# Patient Record
Sex: MALE | Race: OTHER | Employment: FULL TIME | URBAN - METROPOLITAN AREA
[De-identification: names, ages, dates, MRNs, and addresses within clinical notes are randomized per-mention and may not be internally consistent; named-entity substitution may affect disease eponyms.]

---

## 2024-08-14 ENCOUNTER — OFFICE VISIT (OUTPATIENT)
Dept: FAMILY MEDICINE CLINIC | Facility: CLINIC | Age: 34
End: 2024-08-14
Payer: COMMERCIAL

## 2024-08-14 VITALS
SYSTOLIC BLOOD PRESSURE: 108 MMHG | RESPIRATION RATE: 18 BRPM | HEIGHT: 69 IN | OXYGEN SATURATION: 99 % | DIASTOLIC BLOOD PRESSURE: 84 MMHG | TEMPERATURE: 98 F | BODY MASS INDEX: 40.88 KG/M2 | WEIGHT: 276 LBS | HEART RATE: 68 BPM

## 2024-08-14 DIAGNOSIS — M54.9 CHRONIC BACK PAIN, UNSPECIFIED BACK LOCATION, UNSPECIFIED BACK PAIN LATERALITY: ICD-10-CM

## 2024-08-14 DIAGNOSIS — Z83.3 FAMILY HISTORY OF DIABETES MELLITUS: ICD-10-CM

## 2024-08-14 DIAGNOSIS — Z00.00 LABORATORY EXAM ORDERED AS PART OF ROUTINE GENERAL MEDICAL EXAMINATION: ICD-10-CM

## 2024-08-14 DIAGNOSIS — H91.92 HEARING LOSS OF LEFT EAR, UNSPECIFIED HEARING LOSS TYPE: ICD-10-CM

## 2024-08-14 DIAGNOSIS — Z23 NEED FOR TDAP VACCINATION: ICD-10-CM

## 2024-08-14 DIAGNOSIS — E66.01 MORBID OBESITY (HCC): ICD-10-CM

## 2024-08-14 DIAGNOSIS — Z13.1 SCREENING FOR DIABETES MELLITUS: ICD-10-CM

## 2024-08-14 DIAGNOSIS — G89.29 CHRONIC BACK PAIN, UNSPECIFIED BACK LOCATION, UNSPECIFIED BACK PAIN LATERALITY: ICD-10-CM

## 2024-08-14 DIAGNOSIS — Z00.00 WELLNESS EXAMINATION: Primary | ICD-10-CM

## 2024-08-14 LAB
ALBUMIN SERPL-MCNC: 4.5 G/DL (ref 3.2–4.8)
ALBUMIN/GLOB SERPL: 1.5 {RATIO} (ref 1–2)
ALP LIVER SERPL-CCNC: 102 U/L
ALT SERPL-CCNC: 34 U/L
ANION GAP SERPL CALC-SCNC: 5 MMOL/L (ref 0–18)
AST SERPL-CCNC: 22 U/L (ref ?–34)
BASOPHILS # BLD AUTO: 0.06 X10(3) UL (ref 0–0.2)
BASOPHILS NFR BLD AUTO: 0.8 %
BILIRUB SERPL-MCNC: 0.4 MG/DL (ref 0.3–1.2)
BUN BLD-MCNC: 18 MG/DL (ref 9–23)
CALCIUM BLD-MCNC: 9.7 MG/DL (ref 8.7–10.4)
CHLORIDE SERPL-SCNC: 104 MMOL/L (ref 98–112)
CHOLEST SERPL-MCNC: 161 MG/DL (ref ?–200)
CO2 SERPL-SCNC: 28 MMOL/L (ref 21–32)
CREAT BLD-MCNC: 0.96 MG/DL
EGFRCR SERPLBLD CKD-EPI 2021: 108 ML/MIN/1.73M2 (ref 60–?)
EOSINOPHIL # BLD AUTO: 0.3 X10(3) UL (ref 0–0.7)
EOSINOPHIL NFR BLD AUTO: 4.1 %
ERYTHROCYTE [DISTWIDTH] IN BLOOD BY AUTOMATED COUNT: 12.1 %
EST. AVERAGE GLUCOSE BLD GHB EST-MCNC: 120 MG/DL (ref 68–126)
FASTING PATIENT LIPID ANSWER: YES
FASTING STATUS PATIENT QL REPORTED: YES
GLOBULIN PLAS-MCNC: 3.1 G/DL (ref 2–3.5)
GLUCOSE BLD-MCNC: 105 MG/DL (ref 70–99)
HBA1C MFR BLD: 5.8 % (ref ?–5.7)
HCT VFR BLD AUTO: 45.6 %
HCV AB SERPL QL IA: NONREACTIVE
HDLC SERPL-MCNC: 33 MG/DL (ref 40–59)
HGB BLD-MCNC: 15.6 G/DL
IMM GRANULOCYTES # BLD AUTO: 0.05 X10(3) UL (ref 0–1)
IMM GRANULOCYTES NFR BLD: 0.7 %
LDLC SERPL CALC-MCNC: 95 MG/DL (ref ?–100)
LYMPHOCYTES # BLD AUTO: 2.47 X10(3) UL (ref 1–4)
LYMPHOCYTES NFR BLD AUTO: 33.4 %
MCH RBC QN AUTO: 30.2 PG (ref 26–34)
MCHC RBC AUTO-ENTMCNC: 34.2 G/DL (ref 31–37)
MCV RBC AUTO: 88.2 FL
MONOCYTES # BLD AUTO: 0.72 X10(3) UL (ref 0.1–1)
MONOCYTES NFR BLD AUTO: 9.7 %
NEUTROPHILS # BLD AUTO: 3.79 X10 (3) UL (ref 1.5–7.7)
NEUTROPHILS # BLD AUTO: 3.79 X10(3) UL (ref 1.5–7.7)
NEUTROPHILS NFR BLD AUTO: 51.3 %
NONHDLC SERPL-MCNC: 128 MG/DL (ref ?–130)
OSMOLALITY SERPL CALC.SUM OF ELEC: 286 MOSM/KG (ref 275–295)
PLATELET # BLD AUTO: 250 10(3)UL (ref 150–450)
POTASSIUM SERPL-SCNC: 3.8 MMOL/L (ref 3.5–5.1)
PROT SERPL-MCNC: 7.6 G/DL (ref 5.7–8.2)
RBC # BLD AUTO: 5.17 X10(6)UL
SODIUM SERPL-SCNC: 137 MMOL/L (ref 136–145)
TRIGL SERPL-MCNC: 192 MG/DL (ref 30–149)
TSI SER-ACNC: 1.55 MIU/ML (ref 0.55–4.78)
VLDLC SERPL CALC-MCNC: 32 MG/DL (ref 0–30)
WBC # BLD AUTO: 7.4 X10(3) UL (ref 4–11)

## 2024-08-14 PROCEDURE — 3008F BODY MASS INDEX DOCD: CPT | Performed by: FAMILY MEDICINE

## 2024-08-14 PROCEDURE — 80061 LIPID PANEL: CPT | Performed by: FAMILY MEDICINE

## 2024-08-14 PROCEDURE — 3079F DIAST BP 80-89 MM HG: CPT | Performed by: FAMILY MEDICINE

## 2024-08-14 PROCEDURE — 86803 HEPATITIS C AB TEST: CPT | Performed by: FAMILY MEDICINE

## 2024-08-14 PROCEDURE — 90715 TDAP VACCINE 7 YRS/> IM: CPT | Performed by: FAMILY MEDICINE

## 2024-08-14 PROCEDURE — 99203 OFFICE O/P NEW LOW 30 MIN: CPT | Performed by: FAMILY MEDICINE

## 2024-08-14 PROCEDURE — 99395 PREV VISIT EST AGE 18-39: CPT | Performed by: FAMILY MEDICINE

## 2024-08-14 PROCEDURE — 3074F SYST BP LT 130 MM HG: CPT | Performed by: FAMILY MEDICINE

## 2024-08-14 PROCEDURE — 90471 IMMUNIZATION ADMIN: CPT | Performed by: FAMILY MEDICINE

## 2024-08-14 PROCEDURE — 80050 GENERAL HEALTH PANEL: CPT | Performed by: FAMILY MEDICINE

## 2024-08-14 PROCEDURE — 83036 HEMOGLOBIN GLYCOSYLATED A1C: CPT | Performed by: FAMILY MEDICINE

## 2024-08-14 NOTE — PATIENT INSTRUCTIONS
You received the Tdap (tetanus, diphtheria, pertussis-whooping cough) vaccine today. You may run a low grade fever, have mild redness or swelling at the site of the shot, muscle pain at the site of the shot for the next 2-3 days. You may take Tylenol or Ibuprofen as needed. Use your arm to help decrease pain and swelling. You can apply ice to any swelling for 10-15 minutes twice daily through clothing or a towel.    For premenopausal women and men, 1000 mg of calcium daily is recommended. For postmenopausal women, 1200 mg of calcium daily is recommended.    To help the body absorb and use calcium, vitamin D 2000 international units daily is recommended.    Recommendations for exercise are 3-5 times weekly for 30-60 minutes for a minimum of 150-300 minutes.     Get your flu shot and the new COVID booster in October at your local pharmacy.    I will contact you with your test results once available.    Patient Resources:     Personal Training/Fitness Classes/Health Coaching     [facility] in [place]: Full fitness center with group fitness and personal training located in [place].  Health Coaching with Arielle Espinal, Jadon Escalante, and Yaw Lai at our [facility]- individual coaching to work on your health goals. Call [phone] and/or email @ [email or web address]. Free 60 minute consult when client of [facility].  [address] [place] [address] http://www.[address] [place], [place] [address]SolvAxis. A variety of group fitness options plus various yoga classes [phone] and/or email Fartun at [email or web address]  [facility] with multiple locations: DDx Media Fitness (www.Deep Fiber Solutions.Owned it), F45 Training (www.o05toschruu.Owned it), Fit Body Bootcamp (www.SNAPCARDboEnecsysp.Owned it), AV Homes (www.Nezasa.Owned it), The Exercise  (www.exercisecoach.com), Club Pilates (www.clubpilates.Owned it)     Online Fitness  Fitness  on Pinchd  Fit in 10 DVD series                               www.fjufw40YOE.Mobile Broadcast Network  Chair exercises via Sit and Be Fit (www.sitandbefit.org) and Biophotonic Solutions (www.Desktop Genetics.com) or Teofilo Dent or Joe Bhatt videos on YouTube.  Hip Hop Fit with Homar Osman at www.hiphopfit.net     Apps for on the Go Fitness  cinvolve 7 Minute Workout (orange box with white 7) - free on the go HIIT training vera  Peloton Vera @ www.onepeloton.com     Nutrition Trackers and Programs  LoseIT! And My Fitness Pal apps and on line for tracking nutrition  NOOM - virtual health coaching  FitFoundation (healthy meals on the go) in [place] @ www.ufyyywkpjwsyl9gPeel  Bistro MD @ SuperSolver.combistrLean Startup MachinedPeel and Zyjczm89 (calorie smart and low carb plans recommended) @ www.eqmaup64.com, Metabolic Meals @ www.CaleraMetabolicMeals.Mobile Broadcast Network - individual prepared meals to go  Gobble, Blue Apron, Home , Every Plate, Sunbasket- on line meal delivery programs for preparation at home  Meal Village in [place] for homemade meals to go @ www.mealvillage.Mobile Broadcast Network  Diet Doctor @ www.dietdoctor.com - low carb swaps  YuSontra - meal prep and planning vera (Cloudyn.yummly.com)     Stress, Anxiety, Depression, Trauma  CALM meditation vera (www.calm.com)  Headspace  Don't let anxiety run your life. Using the science of emotion regulation and mindfulness to overcome fear and worry by Antonio Hansen PsyD and Neptali Charles MA.  The nivio Podcast (September 27, 2023): 6 Magic Words That Stop Anxiety  What Happened to You?- a look at the impact trauma has on behavior written by Essence Toth and Dr. Gomez Shen  Whole Again by Emiliano Cali - discovering your true self after trauma     Mindful Eating/The Hungry Brain  Am I Hungry? Mindful eating virtual  vera (www.amihungry.com)  The Hungry Brain by Rubi Springer, PhD  Mindless Eating by Ruy Mercado  Weight Loss Surgery Will Not Treat Food Addiction by Aissatou Fontenot Ph.D     Metabolic Dysfunction, Hormones and Cravings  Why We Get Sick? By Jacob  Pamdini (insulin resistance)  Your Body in Balance: The New Science of Food, Hormones, and Health by Dr. Ed Gill  The Complete Guide to fasting by Dr. Guzman  Fast Like a Girl by Dr. Radha Romero  The Menopause Reset by Dr. Radha Romero  Sugar, Salt & Fat by Caitlyn Berkowitz, Ph.D, R.D.  The Truth About Sugar - documentary on sugar (Free on Utube, https://youtu.be/2P1mtxzAM6t)  Reverse Visceral Fat: #1 Way to Increase Your Lifespan & End Inflammation with Dr. Han Burgos on Utube @ https://youtu.be/nupPRnvUpJY?si=ed8dkaGwMIM4BkeW     Nutrition Support  You Are What You Eat - Netfix series on twin study looking at impact of nutrition changes on health  The End of Dieting: How to Live for Life by Dr. Cj Duke M.D. or listen to The Spyra Podcast Episode 63: Understanding \"Nutritarian\" Eating w/Dr. Cj Duke  The Game Changers- Netflix Documentary on plant based nutrition  The Dr. Villagomez T5 Wellness Plan by Dr. Brad Villagomez MD  The Complete Guide to fasting by Dr. Guzman  @[facility] ([facility] Dietician with support surrounding nutrition and meal prep/planning)     Education, Motivation and Support Resources  Live to 100: Secrets of the Blue Zones - Netflix series on the secrets to communities living over 100 years old  Atomic Habits by Jose Brito (a book about taking small steps to promote greater behavior change)   Motivation lizzie (black box with white \")- daily supportive messages sent to your phone  Can't Hurt Me by Antonio Carlson (a book exploring the power of discipline in achieving your goals)  Fed Up - documentary about obesity (Free on Utube)  Www.yourweightmatters.org - Obesity Action Coalition sponsored Blog posts  Obesity Action Coalition Resources on topics specific to weight management (www.obesityaction.org)  Fitlosophy Fitspiration - journal to better health (journal book found at [employer] in fitness aisle)  Nessa Mack talk titled: The Call to Courage (Netflix)  The Exam Room by the  Physician's Committee (Podcast)  Nutrition Facts by Dr. Lacey (Podcast)     Balanced Nutrition includes:      Build the mentality of Food 4 Fuel. Clean eating with whole foods and eliminating/reducing ultra processed foods.  Be an intuitive eater and using mindful eating practices.  Eat a balanced plate with protein and produce at all meals: 1/4 plate- protein, 1/2 plate non starchy veggies, and 1/4 plate fruit or complex carbohydrate.  Drink water with all meals and use a salad plate to naturally reduce portions.  Eliminate/reduce late night eating by stopping after 7pm. Allowing your body to fast for 12 hours (drink only water, tea or black coffee without any additives).                What’s for Lunch? Planning and Prepping the Basics  March 4, 2022  Posted in Blog, Nutrition  By Your Weight Matters Campaign     Whether you work from home or in an office, lunch can be a challenge. Finding time mid-day for a nutrition-packed lunch isn’t easy. Remember, lunch is a time to refuel for the second part of the day. Packing food with a lot of nutrition can make your afternoon more successful! Instead of being pulled to the nearest fast food restaurant, look for new options to add to your day.     Lunch Basics  First, start with these basic tips to eat more power-packed lunches.     Make Your Lunch Balanced  Your lunch should include protein, fruit, vegetables, dairy and whole grains. Adding all the food groups can give you maximum nutrition. Ham and cheese on a whole wheat tortilla with yogurt, apple slices and carrot sticks can be a great meal. It provides protein, complex carbohydrates and fiber. You could also consider a peanut butter and banana sandwich on whole wheat bread with green peppers and low-fat dip, string cheese and strawberries.     Plan Ahead  There is nothing worse than not having a plan for lunch and resorting to a greasy burger with fries. Spend some prep time to get settled for the week by chopping  and bagging vegetables, slicing fruit in containers, and portioning out whole grain crackers. If you prep on Sunday, you can grab and go all week. You could also consider packing the entire lunch the night before.     Cook a Little Extra  Having grilled chicken on Sunday night? [place] a couple extra chicken breasts to chop up for a salad or put inside a whole grain tortilla. Think of this often. Last night’s dinner can be tomorrow’s lunch! An extra serving of chili or leftover pasta are other great options.     Ideas to Get Started with the Main Dish     Find a thermos and have different sizes of containers on hand. This is a great way to use your leftovers!     Chicken, pork or steak with a side of barbecue sauce  Soups and chili  Last night’s dinner -stir lui or casserole (higher caloric density, choose a smaller portion like 1 cup)     Joliet or Wrap: Use low carb or skip the bread all together and use a anisha lettuce leaf as your base  Whole grain bread with lunch meat  Whole grain tortilla with peanut butter and banana (or any fruit--try strawberry!)  Cheese quesadilla     Salads to Go:  Lettuce, veggies and protein (use last night’s protein)  Cold pasta salad with grilled chicken  Tuna or chicken salad     Add Sensible Sides:  [place]ame  Baked chips  Peppers and hummus  Low-fat yogurt  Blueberries  Whole grain crackers  Pasta salad  Dried fruit  Berries and fruit dip  Cheese cubes  Veggies and dip  Avocado slices  Cottage cheese  Last night’s vegetables     What about Lunch Out?  On a busy day, takeout can be an option. You can still make your health a priority while grabbing takeout or eating out. Check out the menu for nutrition-packed proteins and sides. Choose salads and grilled meat and opt for smaller portions. Many restaurants are adding more and more healthy options as time goes on, so making healthy choices keeps getting easier.     Take it one step at a time on your way to a healthy lunch! Every  bite counts.     For practical tips on meal prep, planning, shopping and dining please watch the Utube video presented at the XLW0453 conference by Obesity Action Coalition with the following link:     https://www.obesityaction.org/oac-videos/planning-shopping-and-dining-practical-tactics-for-good-nutrition/#:~:text=Planning%2C%20Shopping%20and%20Dining%3A%20Practical%20Tactics%20for%20Good%20Nutrition

## 2024-08-14 NOTE — PROGRESS NOTES
The 21st Century Cures Act makes medical notes like these available to patients in the interest of transparency. Please be advised this is a medical document. Medical documents are intended to carry relevant information, facts as evident, and the clinical opinion of the practitioner. The medical note is intended as peer to peer communication and may appear blunt or direct. It is written in medical language and may contain abbreviations or verbiage that are unfamiliar.     Oscar R Reyes is a 34 year old male who is here for   Chief Complaint   Patient presents with    Well Adult       HPI:     This 34-year-old male who is a new patient to my practice presents to the office for his annual wellness exam.    The patient states he has intermittent back pain for years.  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  He states the pain travels to different areas of his back.  He has had no associated sciatica.  He works as an  and does a lot of bending and working in awkward positions.  He sees a chiropractor periodically.  He is not currently having any pain and is not currently taking any medications for pain.    Patient has family history for diabetes in his sister and his father and has never been checked for diabetes.  He is currently denying any polyuria, polyphagia, polydipsia.    His father had a heart attack at the age of 65.  The patient thinks he had his cholesterol checked last year but he is not certain.  Patient denies any chest pain, palpitations, shortness of breath, dizziness, syncope or leg swelling.  He has no past history for hypertension or any known heart disease.  He is not a smoker.    The patient states he is actively trying to lose weight.  He goes to the gym at least twice a week.  He has been trying to moderate his diet.    The patient has history for hearing loss documented in the left ear.  He states he was tested at work.  He states his symptoms seem to be improving.  He does wear  hearing protection while at work.    The patient does not know the last time he had a tetanus booster.  He has not received any COVID-vaccine boosters    Exercise:  twice per week, healthclub.  Diet: watches somewhat  Sleep: 5 hours     Depression/Anxiety:  PHQ-2 SCORE: 0  , done 8/14/2024   Last [place] Suicide Screening on 8/14/2024 was No Risk.       Fall Risk: No falls last 3 months  Gun in home:No         Glasses/contacts:Glasses       Recent eye doctor visit:This year, no glaucoma   Hearing aids:No    Family History for:  Testicular CA - No   Prostate CA - No                               Colon CA - No    Colonoscopy: Never    History reviewed. No pertinent past medical history.    History reviewed. No pertinent surgical history.    Family History   Problem Relation Age of Onset    Anxiety Mother     Diabetes Father     Heart Attack Father 65    Diabetes Sister        Social History     Tobacco Use    Smoking status: Never    Smokeless tobacco: Never   Vaping Use    Vaping status: Never Used   Substance and Sexual Activity    Alcohol use: Yes     Comment: rarely    Drug use: Never    Sexual activity: Yes     Works as     No current outpatient medications on file prior to visit.     No current facility-administered medications on file prior to visit.       No Known Allergies    REVIEW OF SYSTEMS:     See HPI for relevant ROS  GENERAL HEALTH: no other complaints  NEURO: no other complaints  VISION: no other complaints  RESPIRATORY: no other complaints  CARDIOVASCULAR: no other complaints  GI: no other complaints  : no other complaints  SKIN: no other complaints  PSYCH: no other complaints  EXT: no other complaints    EXAM:   /84 (BP Location: Left arm, Patient Position: Sitting, Cuff Size: large)   Pulse 68   Temp 98 °F (36.7 °C)   Resp 18   Ht 5' 9\" (1.753 m)   Wt 276 lb (125.2 kg)   SpO2 99%   BMI 40.76 kg/m²     Wt Readings from Last 3 Encounters:   08/14/24 276 lb (125.2  kg)     BP Readings from Last 3 Encounters:   08/14/24 108/84        Visual Acuity  Right Eye Visual Acuity: Corrected Right Eye Chart Acuity: 20/25   Left Eye Visual Acuity: Corrected Left Eye Chart Acuity: 20/25   Both Eyes Visual Acuity: Corrected Both Eyes Chart Acuity: 20/25   Able To Tolerate Visual Acuity: Yes      General: WH/Morbidly obese/WD, in NAD, A and O times 3  HEAD: Normocephalic, atraumatic  EYES: ROCIO, EOMI, conjunctiva normal, sclera anicteric  EARS: Tympanic membranes normal, EAC's normal.  NOSE: Turbninates normal, no bleeding noted.  PHARYNX:  No eythema or exudates, mucous membrane moist, airway patent.  NECK:  No cervical lymphadenopathy or thyromegaly, normal ROM, no bruits noted.  HEART: Regular rate and rhythm, no S3, S4 or murmur noted.  LUNGS: Clear to ausculation. No retractions or tachypnea noted.  ABDOMEN: Soft, nontender, no guarding, rigidity or rebound, no masses or hepatosplenomegaly, normal bowel sounds in all four quadrants.  :Deferred  BACK: No tenderness over the thoracic or lumbar spine. No scoliosis noted. Able to forward flex to 80 degrees without pain.  EXTREMITIES: No clubbing, cyanosis, edema noted. Motor strength +5/5 in all 4 extremities. DTR's +2/4 in all 4 extremities.  SKIN: Warm and dry. Multiple tattoos.  NEURO: Cr. N. II-XII intact, normal gait  PSYCH: Mood and affect are normal.    ASSESSMENT AND PLAN:     1. Wellness examination  -We discussed the following:  Healthy diet and exercise, immunizations, cancer screening.  Patient reminded to get his flu shot and new COVID booster in October at his local pharmacy.    2. Laboratory exam ordered as part of routine general medical examination    - CBC With Differential With Platelet; Future  - Comp Metabolic Panel (14); Future  - Lipid Panel; Future  - TSH W Reflex To Free T4; Future  - HCV Antibody; Future    3. Screening for diabetes mellitus  4. Family history of diabetes mellitus    - Hemoglobin A1C [E];  Future    5. Morbid obesity (HCC)    I encouraged the patient to continue with his workouts at the gym and to try to increase the frequency to at least 3 times a week.  He should continue to monitor his diet.  Handouts with dietary information were provided to the patient.    6. Chronic back pain, unspecified back location, unspecified back pain laterality    The patient states he has had intermittent back pain either in the mid back or lower back occurring for many years.  He is not currently having any back pain.  He has never had any sciatic symptoms.  He states he will get occasional treatments with a chiropractor.  He is aware of the need for proper body mechanics while at work.    7. Hearing loss of left ear, unspecified hearing loss type    The patient had hearing testing done through his employer.  He states the left hearing loss was minimal and it seems to be improving recently.  I again discussed the need for hearing protection while at work and home.  And he will let me know if his symptoms are worsening at which point I would refer him to an ENT physician.    8. Need for Tdap vaccination    The patient was given Tdap booster today.  Side effects are discussed.    - TdaP (Adacel, Boostrix) [[id number]]         Health maintenance:    Health Maintenance   Topic Date Due    Annual Physical  08/14/2025    DTaP,Tdap,and Td Vaccines (1 - Tdap) 08/14/2034    COVID-19 Vaccine (1 - 2023-24 season) 10/01/2024    Influenza Vaccine (1) 10/01/2024    Annual Depression Screening  Completed    Pneumococcal Vaccine: Birth to 64yrs  Aged Out         Orders This Visit:  Orders Placed This Encounter   Procedures    CBC With Differential With Platelet    Comp Metabolic Panel (14)    Lipid Panel    TSH W Reflex To Free T4    HCV Antibody    Hemoglobin A1C [E]    TdaP (Adacel, Boostrix) [46785]       Meds This Visit:  Requested Prescriptions      No prescriptions requested or ordered in this encounter       Imaging &  Referrals:  TETANUS, DIPHTHERIA TOXOIDS AND ACELLULAR PERTUSIS VACCINE (TDAP), >7 YEARS, IM USE     The patient indicates understanding of these issues and agrees to the plan.  The patient is asked to return pending lab results.  Kavitha Chang,     I spent a total of 30 minutes including precharting, H&P, plan of care    This dictation was performed with a verbal recognition program (DRAGON) and it was checked for errors. It is possible that there are still dictated errors within this office note. If so, please bring any errors to my attention for an addendum. All efforts were made to ensure that this office note is accurate

## 2024-08-15 PROBLEM — R73.03 PREDIABETES: Status: ACTIVE | Noted: 2024-08-15
